# Patient Record
Sex: MALE | Race: WHITE | URBAN - METROPOLITAN AREA
[De-identification: names, ages, dates, MRNs, and addresses within clinical notes are randomized per-mention and may not be internally consistent; named-entity substitution may affect disease eponyms.]

---

## 2018-03-12 ENCOUNTER — OFFICE VISIT (OUTPATIENT)
Dept: INTERNAL MEDICINE CLINIC | Age: 48
End: 2018-03-12

## 2018-03-12 VITALS
RESPIRATION RATE: 15 BRPM | WEIGHT: 168.2 LBS | HEART RATE: 75 BPM | SYSTOLIC BLOOD PRESSURE: 100 MMHG | TEMPERATURE: 97 F | DIASTOLIC BLOOD PRESSURE: 62 MMHG | BODY MASS INDEX: 27.03 KG/M2 | HEIGHT: 66 IN | OXYGEN SATURATION: 96 %

## 2018-03-12 DIAGNOSIS — M79.10 MYALGIA: ICD-10-CM

## 2018-03-12 DIAGNOSIS — M25.50 ARTHRALGIA, UNSPECIFIED JOINT: ICD-10-CM

## 2018-03-12 DIAGNOSIS — J45.20 MILD INTERMITTENT ASTHMA, UNSPECIFIED WHETHER COMPLICATED: Primary | ICD-10-CM

## 2018-03-12 RX ORDER — ALBUTEROL SULFATE 90 UG/1
2 AEROSOL, METERED RESPIRATORY (INHALATION)
Qty: 1 INHALER | Refills: 5 | Status: SHIPPED | OUTPATIENT
Start: 2018-03-12

## 2018-03-12 NOTE — PROGRESS NOTES
Establish Care (New patient, here to establish care.)       HPI:  Severo Silversmith is a 50y.o. year old male who is here to establish care. He  had his medical care:    PCP (across the street)    He reports the following history and medical concerns:      Phantom joint pain that travels. Stays for a day and then goes away. ? Gout. Father has gout. Holistic approach. Right elbow- had an \"egg\" on elbow for 2 months. Didn't it really hurt. And then it went down. internet search \"palindrome rheumatism\"    4-5 years duration. More frequent recently. Sometimes joint swelling but not often. Most common knees and sometimes hands. Can't make a fist sometime. One left knuckle index finger. No pain in wrists. Never at same time. - had knees xrayed a couple of years ago. Saw knee specialist.  Was on a business trip and had to hobble. Talked with PCP about it but didn't do blood tests. Lasts a day to a week. Takes 600 mg ibuprofen and it does help. Last flare up- a couple of weeks ago on elbow when he had swelling. Once every 2 months. Diet is pretty good. No fever, fatigue. Appetite is good. Hx of mild asthma.  (has an inhaler)    Dr. Lillie Hampton    No GI symptoms- a lot of stress for 2 years. Assessment and Plan        1. Mild intermittent asthma, unspecified whether complicated  Rare episodes but have albuterol on hand. - albuterol (PROVENTIL HFA, VENTOLIN HFA, PROAIR HFA) 90 mcg/actuation inhaler; Take 2 Puffs by inhalation every four (4) hours as needed for Wheezing. Indications: BRONCHOSPASM PREVENTION  Dispense: 1 Inhaler; Refill: 5    2. Arthralgia, unspecified joint  Suspect GI etiology triggering inflammation in joints. Mostly knee issues  Turmeric may help    - RAMONA PANEL  - CBC WITH AUTOMATED DIFF  - TSH REFLEX TO T4  - METABOLIC PANEL, COMPREHENSIVE  - VITAMIN D, 25 HYDROXY  - URIC ACID  - C REACTIVE PROTEIN, QT    3.  Myalgia  Suspect food related- food diary.  - VITAMIN D, 25 HYDROXY          Visit Vitals    /62 (BP 1 Location: Left arm, BP Patient Position: Sitting)    Pulse 75    Temp 97 °F (36.1 °C) (Oral)    Resp 15    Ht 5' 6\" (1.676 m)    Wt 168 lb 3.2 oz (76.3 kg)    SpO2 96%    BMI 27.15 kg/m2       Historical Data    Past Medical History:   Diagnosis Date    Mild intermittent asthma 3/12/2018       Past Surgical History:   Procedure Laterality Date    HX VASECTOMY  2005       Outpatient Encounter Prescriptions as of 3/12/2018   Medication Sig Dispense Refill    albuterol (PROVENTIL HFA, VENTOLIN HFA, PROAIR HFA) 90 mcg/actuation inhaler Take 2 Puffs by inhalation every four (4) hours as needed for Wheezing. Indications: BRONCHOSPASM PREVENTION 1 Inhaler 5     No facility-administered encounter medications on file as of 3/12/2018. No Known Allergies     Social History     Social History    Marital status:      Spouse name: N/A    Number of children: N/A    Years of education: N/A     Occupational History    Not on file. Social History Main Topics    Smoking status: Never Smoker    Smokeless tobacco: Never Used    Alcohol use 1.8 oz/week     1 Glasses of wine, 2 Cans of beer per week    Drug use: No    Sexual activity: Yes     Partners: Female     Other Topics Concern    Not on file     Social History Narrative    No narrative on file        family history includes Gout in his father; Stroke in his father. Review of Systems   Constitutional: Negative for weight loss. Eyes: Negative for blurred vision. Respiratory: Negative for shortness of breath. Cardiovascular: Negative for chest pain. Gastrointestinal: Negative for abdominal pain. Genitourinary: Negative for dysuria, flank pain, frequency and hematuria. Musculoskeletal: Positive for joint pain. Negative for myalgias and neck pain. Skin: Negative for rash. Neurological: Negative for dizziness, focal weakness, weakness and headaches. Endo/Heme/Allergies: Negative for environmental allergies. Does not bruise/bleed easily. Physical Exam   Constitutional: He is oriented to person, place, and time. He appears well-nourished. No distress. Neck: Carotid bruit is not present. No thyromegaly present. Cardiovascular: Normal rate, regular rhythm and normal heart sounds. Pulmonary/Chest: Effort normal and breath sounds normal. No respiratory distress. He has no wheezes. Abdominal: Soft. Bowel sounds are normal. He exhibits no mass. There is no tenderness. Musculoskeletal: He exhibits no edema or tenderness. Lymphadenopathy:     He has no cervical adenopathy. Neurological: He is alert and oriented to person, place, and time. Skin: Skin is warm and dry. No rash noted. No erythema. Psychiatric: He has a normal mood and affect. Thought content normal.   Nursing note and vitals reviewed. Ortho Exam       Orders Placed This Encounter    albuterol (PROVENTIL HFA, VENTOLIN HFA, PROAIR HFA) 90 mcg/actuation inhaler     Sig: Take 2 Puffs by inhalation every four (4) hours as needed for Wheezing. Indications: BRONCHOSPASM PREVENTION     Dispense:  1 Inhaler     Refill:  5        I have reviewed the patient's medical history in detail and updated the computerized patient record. We had a prolonged discussion about these complex clinical issues and went over the various important aspects to consider. All questions were answered. Advised him to call back or return to office if symptoms do not improve, change in nature, or persist.    He was given an after visit summary or informed of [facility] which includes patient instructions, diagnoses, current medications, & vitals. He expressed understanding with the diagnosis and plan.

## 2018-03-12 NOTE — PATIENT INSTRUCTIONS
Turmeric (curcumin)  Radha hodge milk              HEALTHY SWEETS  How much: Sparingly  Healthy choices: Unsweetened dried fruit, dark chocolate, fruit sorbet  Why: Dark chocolate provides polyphenols with antioxidant activity. Choose dark chocolate with at least 70 percent pure cocoa and have an ounce a few times a week. Fruit sorbet is a better option than other frozen desserts. RED WINE  How much: Optional, no more than 1-2 glasses per day  Healthy choices: Organic red wine   Why: Red wine has beneficial antioxidant activity. Limit intake to no more than 1-2 servings per day. If you do not drink alcohol, do not start. SUPPLEMENTS  How much: Daily   Healthy choices: High quality multivitamin/multimineral that includes key antioxidants (vitamin C, vitamin E, mixed carotenoids, and selenium); co-enzyme Q10; 2-3 grams of a molecularly distilled fish oil; 2,000 IU of vitamin D3   Why: Supplements help fill any gaps in your diet when you are unable to get your daily requirement of micronutrients. Click here to learn more about supplements and get your free recommendation. TEA  How much: 2-4 cups per day  Healthy choices: White, green, oolong teas  Why: Tea is rich in catechins, antioxidant compounds that reduce inflammation. Purchase high-quality tea and learn how to correctly brew it for maximum taste and health benefits. HEALTHY HERBS & SPICES  How much: Unlimited amounts  Healthy choices: Turmeric, marcano powder (which contains turmeric), benito and garlic (dried and fresh), chili peppers, basil, cinnamon, rosemary, thyme  Why: Use these herbs and spices generously to season foods. Turmeric and benito are powerful, natural anti-inflammatory agents.   OTHER SOURCES OF PROTEIN  How much: 1-2 servings a week (one portion is equal to 1 ounce of cheese, 1 eight-ounce serving of dairy, 1 egg, 3 ounces cooked poultry or skinless meat)  Healthy choices: High quality natural cheese and yogurt, omega-3 enriched eggs, skinless poultry, grass-fed lean meats  Why: In general, try to reduce consumption of animal foods. If you eat chicken, choose organic, cage-free chicken and remove the skin and associated fat. Use organic, reduced-fat dairy products moderately, especially yogurt and natural cheeses such as Emmental (Swiss), [place] the [place] and true ROSS. If you eat eggs, choose omega-3 enriched eggs (made by feeding hens a flax-meal-enriched diet), or organic eggs from free-range chickens. COOKED  MUSHROOMS  How much: Unlimited amounts  Healthy choices: Shiitake, enokidake, maitake, oyster mushrooms (and wild mushrooms if available)   Why: These mushrooms contain compounds that enhance immune function. Never eat mushrooms raw, and minimize consumption of common commercial button mushrooms (including crimini and portobello). WHOLE SOY FOODS  How much: 1-2 servings per day (one serving is equal to ½ cup tofu or tempeh, 1 cup soymilk, ½ cup cooked edamame, 1 ounce of soynuts)  Healthy choices: Tofu, tempeh, edamame, soy nuts, soymilk  Why: Soy foods contain isoflavones that have antioxidant activity and are protective against cancer. Choose whole soy foods over fractionated foods like isolated soy protein powders and imitation meats made with soy isolate. FISH & SEAFOOD  How much:  2-6 servings per week (one serving is equal to 4 ounces of fish or seafood)  Healthy choices: Wild [place] salmon (especially sockeye), herring, sardines, and black cod (sablefish)  Why: These fish are rich in omega-3 fats, which are strongly anti-inflammatory. If you choose not to eat fish, take a molecularly distilled fish oil supplement that provides both EPA and DHA in a dose of 2-3 grams per day. HEALTHY FATS  How much:  5-7 servings per day (one serving is equal to 1 teaspoon of oil, 2 walnuts, 1 tablespoon of flaxseed, 1 ounce of avocado)   Healthy choices: For cooking, use extra virgin olive oil and expeller-pressed organic canola oil. Other sources of healthy fats include nuts (especially walnuts), avocados, and seeds - including hemp seeds and freshly ground flaxseed. Omega-3 fats are also found in cold water fish, omega-3 enriched eggs, and whole soy foods. Organic, expeller pressed, high-oleic sunflower or safflower oils may also be used, as well as walnut and hazelnut oils in salads and dark roasted sesame oil as a flavoring for soups and stir-fries  Why: Healthy fats are those rich in either monounsaturated or omega-3 fats. Extra-virgin olive oil is rich in polyphenols with antioxidant activity and canola oil contains a small fraction of omega-3 fatty acids. WHOLE & CRACKED GRAINS  How much:  3-5 servings a day (one serving is equal to about ½ cup cooked grains)  Healthy choices: [place], basmati rice, wild rice, buckwheat, groats, barley, quinoa, steel-cut oats   Why: Whole grains digest slowly, reducing frequency of spikes in blood sugar that promote inflammation. \"Whole grains\" means grains that are intact or in a few large pieces, not whole wheat bread or other products made from flour. PASTA (al dente)  How much: 2-3 servings per week (one serving is equal to about ½ cup cooked pasta)  Healthy choices: Organic pasta, rice noodles, bean thread noodles, and part whole wheat and buckwheat noodles like Japanese udon and soba  Why: Pasta cooked al dente (when it has \"tooth\" to it) has a lower glycemic index than fully-cooked pasta. Low-glycemic-load carbohydrates should be the bulk of your carbohydrate intake to help minimize spikes in blood glucose levels. BEANS & LEGUMES  How much: 1-2 servings per day (one serving is equal to ½ cup cooked beans or legumes)  Healthy choices: Beans like [place], adzuki and black, as well as chickpeas, black-eyed peas and lentils  Why: Beans are rich in folic acid, magnesium, potassium and soluble fiber. They are a low-glycemic-load food.   Eat them well-cooked either whole or pureed into spreads like hummus. VEGETABLES  How much: 4-5 servings per day minimum (one serving is equal to 2 cups salad greens, ½ cup vegetables cooked, raw or juiced)  Healthy Choices: Lightly cooked dark leafy greens (spinach, sepideh greens, kale, Swiss chard), cruciferous vegetables (broccoli, cabbage, [place] sprouts, kale, bok jarad and cauliflower), carrots, beets, onions, peas, squashes, sea vegetables and washed raw salad greens  Why: Vegetables are rich in flavonoids and carotenoids with both antioxidant and anti-inflammatory activity. Go for a wide range of colors, eat them both raw and cooked, and choose organic when possible. FRUITS  How much:  3-4 servings per day (one serving is equal to 1 medium size piece of fruit, ½ cup chopped fruit, ¼ cup of dried fruit)  Healthy choices: Raspberries, blueberries, strawberries, peaches, nectarines, oranges, pink grapefruit, red grapes, plums, pomegranates, blackberries, cherries, apples, and pears - all lower in glycemic load than most tropical fruits  Why: Fruits are rich in flavonoids and carotenoids with both antioxidant and anti-inflammatory activity. Go for a wide range of colors, choose fruit that is fresh in season or frozen, and buy organic when possible. Additional Item:  WATER  How much: Throughout the day  Healthy choices: Drink pure water, or drinks that are mostly water (tea, very diluted fruit juice, sparkling water with lemon) throughout the day. Why: Water is vital for overall functioning of the body. Exercise 1:  The 4-7-8 (or Relaxing Breath) Exercise  This exercise is utterly simple, takes almost no time, requires no equipment and can be done anywhere. Although you can do the exercise in any position, sit with your back straight while learning the exercise. Place the tip of your tongue against the ridge of tissue just behind your upper front teeth, and keep it there through the entire exercise.  You will be exhaling through your mouth around your tongue; try pursing your lips slightly if this seems awkward.  Exhale completely through your mouth, making a whoosh sound.  Close your mouth and inhale quietly through your nose to a mental count of four.  Hold your breath for a count of seven.  Exhale completely through your mouth, making a whoosh sound to a count of eight.  This is one breath. Now inhale again and repeat the cycle three more times for a total of four breaths. Note that you always inhale quietly through your nose and exhale audibly through your mouth. The tip of your tongue stays in position the whole time. Exhalation takes twice as long as inhalation. The absolute time you spend on each phase is not important; the ratio of 4:7:8 is important. If you have trouble holding your breath, speed the exercise up but keep to the ratio of 4:7:8 for the three phases. With practice you can slow it all down and get used to inhaling and exhaling more and more deeply. This exercise is a natural tranquilizer for the nervous system. Unlike tranquilizing drugs, which are often effective when you first take them but then lose their power over time, this exercise is subtle when you first try it but gains in power with repetition and practice. Do it at least twice a day. You cannot do it too frequently. Do not do more than four breaths at one time for the first month of practice. Later, if you wish, you can extend it to eight breaths. If you feel a little lightheaded when you first breathe this way, do not be concerned; it will pass. Once you develop this technique by practicing it every day, it will be a very useful tool that you will always have with you. Use it whenever anything upsetting happens - before you react. Use it whenever you are aware of internal tension. Use it to help you fall asleep. This exercise cannot be recommended too highly. Everyone can benefit from it.         Taken from Rhys Thomas MD [facility]  Search [place] breathing exercises on google and you will see a video       Exercise 1:  The 4-7-8 (or Relaxing Breath) Exercise  This exercise is utterly simple, takes almost no time, requires no equipment and can be done anywhere. Although you can do the exercise in any position, sit with your back straight while learning the exercise. Place the tip of your tongue against the ridge of tissue just behind your upper front teeth, and keep it there through the entire exercise. You will be exhaling through your mouth around your tongue; try pursing your lips slightly if this seems awkward.  Exhale completely through your mouth, making a whoosh sound.  Close your mouth and inhale quietly through your nose to a mental count of four.  Hold your breath for a count of seven.  Exhale completely through your mouth, making a whoosh sound to a count of eight.  This is one breath. Now inhale again and repeat the cycle three more times for a total of four breaths. Note that you always inhale quietly through your nose and exhale audibly through your mouth. The tip of your tongue stays in position the whole time. Exhalation takes twice as long as inhalation. The absolute time you spend on each phase is not important; the ratio of 4:7:8 is important. If you have trouble holding your breath, speed the exercise up but keep to the ratio of 4:7:8 for the three phases. With practice you can slow it all down and get used to inhaling and exhaling more and more deeply. This exercise is a natural tranquilizer for the nervous system. Unlike tranquilizing drugs, which are often effective when you first take them but then lose their power over time, this exercise is subtle when you first try it but gains in power with repetition and practice. Do it at least twice a day. You cannot do it too frequently. Do not do more than four breaths at one time for the first month of practice.  Later, if you wish, you can extend it to eight breaths. If you feel a little lightheaded when you first breathe this way, do not be concerned; it will pass. Once you develop this technique by practicing it every day, it will be a very useful tool that you will always have with you. Use it whenever anything upsetting happens - before you react. Use it whenever you are aware of internal tension. Use it to help you fall asleep. This exercise cannot be recommended too highly. Everyone can benefit from it. Exercise 2:  Breath Counting  If you want to get a feel for this challenging work, try your hand at breath counting, a deceptively simple technique much used in [address] practice. Sit in a comfortable position with the spine straight and head inclined slightly forward. Gently close your eyes and take a few deep breaths. Then let the breath come naturally without trying to influence it. Ideally it will be quiet and slow, but depth and rhythm may vary.  To begin the exercise, count \"one\" to yourself as you exhale.  The next time you exhale, count \"two,\" and so on up to [place]. \"   Then begin a new cycle, counting \"one\" on the next exhalation. Never count higher than \"five,\" and count only when you exhale. You will know your attention has wandered when you find yourself up to \"eight,\" \"12,\" even \"19. \"  Try to do 10 minutes of this form of meditation.      Taken from Gi Becerra MD [facility] [address] [place]" breathing exercises on [place] and you will see a video

## 2018-03-12 NOTE — MR AVS SNAPSHOT
[address] [facility], [address] [place] [address] 
[phone] Patient: Faizan Correa 
MRN: [id number] :1970 Visit Information Date & Time Provider Department Dept. Phone Encounter #  
 3/12/2018 10:45 AM Zoran Koo MD [facility] Internists [phone] Upcoming Health Maintenance Date Due DTaP/Tdap/Td series (1 - Tdap) 1989 Influenza Age 5 to Adult 2017 Allergies as of 3/12/2018  Review Complete On: 3/12/2018 By: Zoran Koo MD  
 No Known Allergies Current Immunizations  Never Reviewed No immunizations on file. Not reviewed this visit You Were Diagnosed With   
  
 Codes Comments Mild intermittent asthma, unspecified whether complicated    -  Primary ICD-10-CM: J45.20 ICD-9-CM: 493.90 Arthralgia, unspecified joint     ICD-10-CM: M25.50 ICD-9-CM: 719.40 Myalgia     ICD-10-CM: M79.1 ICD-9-CM: 729.1 Vitals BP Pulse Temp Resp Height(growth percentile) Weight(growth percentile) 100/62 (BP 1 Location: Left arm, BP Patient Position: Sitting) 75 97 °F (36.1 °C) (Oral) 15 5' 6\" (1.676 m) 168 lb 3.2 oz (76.3 kg) SpO2 BMI Smoking Status 96% 27.15 kg/m2 Never Smoker Vitals History BMI and BSA Data Body Mass Index Body Surface Area  
 27.15 kg/m 2 1.88 m 2 Your Updated Medication List  
  
   
This list is accurate as of 3/12/18 11:58 AM.  Always use your most recent med list.  
  
  
  
  
 albuterol 90 mcg/actuation inhaler Commonly known as:  PROVENTIL HFA, VENTOLIN HFA, PROAIR HFA Take 2 Puffs by inhalation every four (4) hours as needed for Wheezing. Indications: BRONCHOSPASM PREVENTION Prescriptions Printed Refills  
 albuterol (PROVENTIL HFA, VENTOLIN HFA, PROAIR HFA) 90 mcg/actuation inhaler 5  Sig: Take 2 Puffs by inhalation every four (4) hours as needed for Wheezing. Indications: BRONCHOSPASM PREVENTION Class: Print Route: Inhalation We Performed the Following RAMONA PANEL [id number] CPT(R)] C REACTIVE PROTEIN, QT [90825 CPT(R)] CBC WITH AUTOMATED DIFF [33109 CPT(R)] METABOLIC PANEL, COMPREHENSIVE [35479 CPT(R)] TSH REFLEX TO T4 [20014 CPT(R)] URIC ACID [id number] CPT(R)] VITAMIN D, 25 HYDROXY [id number] CPT(R)] Patient Instructions Turmeric (curcumin) Radha hodge milk HEALTHY SWEETS How much: Sparingly Healthy choices: Unsweetened dried fruit, dark chocolate, fruit sorbet Why: Dark chocolate provides polyphenols with antioxidant activity. Choose dark chocolate with at least 70 percent pure cocoa and have an ounce a few times a week. Fruit sorbet is a better option than other frozen desserts. RED WINE How much: Optional, no more than 1-2 glasses per day Healthy choices: Organic red wine Why: Red wine has beneficial antioxidant activity. Limit intake to no more than 1-2 servings per day. If you do not drink alcohol, do not start. SUPPLEMENTS How much: Daily Healthy choices: High quality multivitamin/multimineral that includes key antioxidants (vitamin C, vitamin E, mixed carotenoids, and selenium); co-enzyme Q10; 2-3 grams of a molecularly distilled fish oil; 2,000 IU of vitamin D3 Why: Supplements help fill any gaps in your diet when you are unable to get your daily requirement of micronutrients. Click here to learn more about supplements and get your free recommendation. TEA How much: 2-4 cups per day Healthy choices: White, green, oolong teas Why: Tea is rich in catechins, antioxidant compounds that reduce inflammation. Purchase high-quality tea and learn how to correctly brew it for maximum taste and health benefits. [facility] How much: Unlimited amounts Healthy choices: Turmeric, marcano powder (which contains turmeric), benito and garlic (dried and fresh), chili peppers, basil, cinnamon, rosemary, thyme Why: Use these herbs and spices generously to season foods. Turmeric and benito are powerful, natural anti-inflammatory agents. OTHER SOURCES OF PROTEIN How much: 1-2 servings a week (one portion is equal to 1 ounce of cheese, 1 eight-ounce serving of dairy, 1 egg, 3 ounces cooked poultry or skinless meat) Healthy choices: High quality natural cheese and yogurt, omega-3 enriched eggs, skinless poultry, grass-fed lean meats Why: In general, try to reduce consumption of animal foods. If you eat chicken, choose organic, cage-free chicken and remove the skin and associated fat. Use organic, reduced-fat dairy products moderately, especially yogurt and natural cheeses such as Emmental (Swiss), [place] and true ROSS. If you eat eggs, choose omega-3 enriched eggs (made by feeding hens a flax-meal-enriched diet), or organic eggs from free-range chickens. [facility] How much: Unlimited amounts Healthy choices: Shiitake, enokidake, maitake, oyster mushrooms (and wild mushrooms if available) Why: These mushrooms contain compounds that enhance immune function. Never eat mushrooms raw, and minimize consumption of common commercial button mushrooms (including crimini and portobello). WHOLE SOY FOODS How much: 1-2 servings per day (one serving is equal to ½ cup tofu or tempeh, 1 cup soymilk, ½ cup cooked edamame, 1 ounce of soynuts) Healthy choices: Tofu, tempeh, edamame, soy nuts, soymilk Why: Soy foods contain isoflavones that have antioxidant activity and are protective against cancer. Choose whole soy foods over fractionated foods like isolated soy protein powders and imitation meats made with soy isolate. FISH & SEAFOOD How much:  2-6 servings per week (one serving is equal to 4 ounces of fish or seafood) Healthy choices: Wild [place] salmon (especially sockeye), herring, sardines, and black cod (sablefish) Why: These fish are rich in omega-3 fats, which are strongly anti-inflammatory. If you choose not to eat fish, take a molecularly distilled fish oil supplement that provides both EPA and DHA in a dose of 2-3 grams per day. HEALTHY FATS How much:  5-7 servings per day (one serving is equal to 1 teaspoon of oil, 2 walnuts, 1 tablespoon of flaxseed, 1 ounce of avocado) Healthy choices: For cooking, use extra virgin olive oil and expeller-pressed organic canola oil. Other sources of healthy fats include nuts (especially walnuts), avocados, and seeds - including hemp seeds and freshly ground flaxseed. Omega-3 fats are also found in cold water fish, omega-3 enriched eggs, and whole soy foods. Organic, expeller pressed, high-oleic sunflower or safflower oils may also be used, as well as walnut and hazelnut oils in salads and dark roasted sesame oil as a flavoring for soups and stir-fries Why: Healthy fats are those rich in either monounsaturated or omega-3 fats. Extra-virgin olive oil is rich in polyphenols with antioxidant activity and canola oil contains a small fraction of omega-3 fatty acids. WHOLE & CRACKED GRAINS How much:  3-5 servings a day (one serving is equal to about ½ cup cooked grains) Healthy choices: Brown rice, basmati rice, wild rice, buckwheat, groats, barley, quinoa, steel-cut oats Why: Whole grains digest slowly, reducing frequency of spikes in blood sugar that promote inflammation. \"Whole grains\" means grains that are intact or in a few large pieces, not whole wheat bread or other products made from flour. PASTA (al dente) How much: 2-3 servings per week (one serving is equal to about ½ cup cooked pasta) Healthy choices: Organic pasta, rice noodles, bean thread noodles, and part whole wheat and buckwheat noodles like [place] udon and soba Why: Pasta cooked al dente (when it has \"tooth\" to it) has a lower glycemic index than fully-cooked pasta.  Low-glycemic-load carbohydrates should be the bulk of your carbohydrate intake to help minimize spikes in blood glucose levels. BEANS & LEGUMES How much: 1-2 servings per day (one serving is equal to ½ cup cooked beans or legumes) Healthy choices: Beans like [place], adzuki and black, as well as chickpeas, black-eyed peas and lentils Why: Beans are rich in folic acid, magnesium, potassium and soluble fiber. They are a low-glycemic-load food. Eat them well-cooked either whole or pureed into spreads like hummus. VEGETABLES How much: 4-5 servings per day minimum (one serving is equal to 2 cups salad greens, ½ cup vegetables cooked, raw or juiced) Healthy Choices: Lightly cooked dark leafy greens (spinach, sepideh greens, kale, Swiss chard), cruciferous vegetables (broccoli, cabbage, [place] sprouts, kale, bok jarad and cauliflower), carrots, beets, onions, peas, squashes, sea vegetables and washed raw salad greens Why: Vegetables are rich in flavonoids and carotenoids with both antioxidant and anti-inflammatory activity. Go for a wide range of colors, eat them both raw and cooked, and choose organic when possible. FRUITS How much:  3-4 servings per day (one serving is equal to 1 medium size piece of fruit, ½ cup chopped fruit, ¼ cup of dried fruit) Healthy choices: Raspberries, blueberries, strawberries, peaches, nectarines, oranges, pink grapefruit, red grapes, plums, pomegranates, blackberries, cherries, apples, and pears - all lower in glycemic load than most tropical fruits Why: Fruits are rich in flavonoids and carotenoids with both antioxidant and anti-inflammatory activity. Go for a wide range of colors, choose fruit that is fresh in season or frozen, and buy organic when possible. Additional Item: 
WATER How much: Throughout the day Healthy choices: Drink pure water, or drinks that are mostly water (tea, very diluted fruit juice, sparkling water with lemon) throughout the day. Why: Water is vital for overall functioning of the body. Exercise 1: 
The 4-7-8 (or Relaxing Breath) Exercise This exercise is utterly simple, takes almost no time, requires no equipment and can be done anywhere. Although you can do the exercise in any position, sit with your back straight while learning the exercise. Place the tip of your tongue against the ridge of tissue just behind your upper front teeth, and keep it there through the entire exercise. You will be exhaling through your mouth around your tongue; try pursing your lips slightly if this seems awkward. ? Exhale completely through your mouth, making a whoosh sound. ? Close your mouth and inhale quietly through your nose to a mental count of four. ? Hold your breath for a count of seven. ? Exhale completely through your mouth, making a whoosh sound to a count of eight. ? This is one breath. Now inhale again and repeat the cycle three more times for a total of four breaths. Note that you always inhale quietly through your nose and exhale audibly through your mouth. The tip of your tongue stays in position the whole time. Exhalation takes twice as long as inhalation. The absolute time you spend on each phase is not important; the ratio of 4:7:8 is important. If you have trouble holding your breath, speed the exercise up but keep to the ratio of 4:7:8 for the three phases. With practice you can slow it all down and get used to inhaling and exhaling more and more deeply. This exercise is a natural tranquilizer for the nervous system. Unlike tranquilizing drugs, which are often effective when you first take them but then lose their power over time, this exercise is subtle when you first try it but gains in power with repetition and practice. Do it at least twice a day. You cannot do it too frequently. Do not do more than four breaths at one time for the first month of practice.  Later, if you wish, you can extend it to eight breaths. If you feel a little lightheaded when you first breathe this way, do not be concerned; it will pass. Once you develop this technique by practicing it every day, it will be a very useful tool that you will always have with you. Use it whenever anything upsetting happens - before you react. Use it whenever you are aware of internal tension. Use it to help you fall asleep. This exercise cannot be recommended too highly. Everyone can benefit from it. Taken from Chelsie Carmona MD [facility] of [address] Search Vimmax Carmona breathing exercises on google and you will see a video Exercise 1: 
The 4-7-8 (or Relaxing Breath) Exercise This exercise is utterly simple, takes almost no time, requires no equipment and can be done anywhere. Although you can do the exercise in any position, sit with your back straight while learning the exercise. Place the tip of your tongue against the ridge of tissue just behind your upper front teeth, and keep it there through the entire exercise. You will be exhaling through your mouth around your tongue; try pursing your lips slightly if this seems awkward. ? Exhale completely through your mouth, making a whoosh sound. ? Close your mouth and inhale quietly through your nose to a mental count of four. ? Hold your breath for a count of seven. ? Exhale completely through your mouth, making a whoosh sound to a count of eight. ? This is one breath. Now inhale again and repeat the cycle three more times for a total of four breaths. Note that you always inhale quietly through your nose and exhale audibly through your mouth. The tip of your tongue stays in position the whole time. Exhalation takes twice as long as inhalation. The absolute time you spend on each phase is not important; the ratio of 4:7:8 is important.  If you have trouble holding your breath, speed the exercise up but keep to the ratio of 4:7:8 for the three phases. With practice you can slow it all down and get used to inhaling and exhaling more and more deeply. This exercise is a natural tranquilizer for the nervous system. Unlike tranquilizing drugs, which are often effective when you first take them but then lose their power over time, this exercise is subtle when you first try it but gains in power with repetition and practice. Do it at least twice a day. You cannot do it too frequently. Do not do more than four breaths at one time for the first month of practice. Later, if you wish, you can extend it to eight breaths. If you feel a little lightheaded when you first breathe this way, do not be concerned; it will pass. Once you develop this technique by practicing it every day, it will be a very useful tool that you will always have with you. Use it whenever anything upsetting happens - before you react. Use it whenever you are aware of internal tension. Use it to help you fall asleep. This exercise cannot be recommended too highly. Everyone can benefit from it. Exercise 2: 
Breath Counting If you want to get a feel for this challenging work, try your hand at breath counting, a deceptively simple technique much used in [address] practice. Sit in a comfortable position with the spine straight and head inclined slightly forward. Gently close your eyes and take a few deep breaths. Then let the breath come naturally without trying to influence it. Ideally it will be quiet and slow, but depth and rhythm may vary. ? To begin the exercise, count \"one\" to yourself as you exhale. ? The next time you exhale, count \"two,\" and so on up to [place]. \" 
? Then begin a new cycle, counting \"one\" on the next exhalation. Never count higher than \"five,\" and count only when you exhale. You will know your attention has wandered when you find yourself up to \"eight,\" \"12,\" even \"19. \" Try to do 10 minutes of this form of meditation. Taken from Gi Becerra MD [facility] of [address] Search Greg Becerra breathing exercises on google and you will see a video Introducing [address]! [facility] introduces [employer] patient portal. Now you can access parts of your medical record, email your doctor's office, and request medication refills online. 1. In your internet browser, go to [email or web address] 2. Click on the First Time User? Click Here link in the Sign In box. You will see the New Member Sign Up page. 3. Enter your [employer] Access Code exactly as it appears below. You will not need to use this code after youve completed the sign-up process. If you do not sign up before the expiration date, you must request a new code. · [employer] Access Code: [email or web address] Expires: 6/10/2018 11:13 AM 
 
4. Enter the last four digits of your Social Security Number (xxxx) and Date of Birth (mm/dd/yyyy) as indicated and click Submit. You will be taken to the next sign-up page. 5. Create a [employer] ID. This will be your [employer] login ID and cannot be changed, so think of one that is secure and easy to remember. 6. Create a [employer] password. You can change your password at any time. 7. Enter your Password Reset Question and Answer. This can be used at a later time if you forget your password. 8. Enter your e-mail address. You will receive e-mail notification when new information is available in [address] 9. Click Sign Up. You can now view and download portions of your medical record. 10. Click the Download Summary menu link to download a portable copy of your medical information. If you have questions, please visit the Frequently Asked Questions section of the [employer] website. Remember, [employer] is NOT to be used for urgent needs. For medical emergencies, dial 911. Now available from your iPhone and Android! Please provide this summary of care documentation to your next provider. Your primary care clinician is listed as Saul Shi. If you have any questions after today's visit, please call [phone].

## 2018-03-13 LAB
25(OH)D3+25(OH)D2 SERPL-MCNC: 15.9 NG/ML (ref 30–100)
ALBUMIN SERPL-MCNC: 4.6 G/DL (ref 3.5–5.5)
ALBUMIN/GLOB SERPL: 1.6 {RATIO} (ref 1.2–2.2)
ALP SERPL-CCNC: 59 IU/L (ref 39–117)
ALT SERPL-CCNC: 29 IU/L (ref 0–44)
ANA TITR SER IF: NEGATIVE {TITER}
AST SERPL-CCNC: 22 IU/L (ref 0–40)
BASOPHILS # BLD AUTO: 0 X10E3/UL (ref 0–0.2)
BASOPHILS NFR BLD AUTO: 1 %
BILIRUB SERPL-MCNC: 0.6 MG/DL (ref 0–1.2)
BUN SERPL-MCNC: 17 MG/DL (ref 6–24)
BUN/CREAT SERPL: 16 (ref 9–20)
CALCIUM SERPL-MCNC: 9.4 MG/DL (ref 8.7–10.2)
CHLORIDE SERPL-SCNC: 101 MMOL/L (ref 96–106)
CO2 SERPL-SCNC: 27 MMOL/L (ref 18–29)
CREAT SERPL-MCNC: 1.05 MG/DL (ref 0.76–1.27)
CRP SERPL-MCNC: 1.3 MG/L (ref 0–4.9)
EOSINOPHIL # BLD AUTO: 0.1 X10E3/UL (ref 0–0.4)
EOSINOPHIL NFR BLD AUTO: 2 %
ERYTHROCYTE [DISTWIDTH] IN BLOOD BY AUTOMATED COUNT: 13 % (ref 12.3–15.4)
GFR SERPLBLD CREATININE-BSD FMLA CKD-EPI: 83 ML/MIN/1.73
GFR SERPLBLD CREATININE-BSD FMLA CKD-EPI: 96 ML/MIN/1.73
GLOBULIN SER CALC-MCNC: 2.9 G/DL (ref 1.5–4.5)
GLUCOSE SERPL-MCNC: 91 MG/DL (ref 65–99)
HCT VFR BLD AUTO: 44 % (ref 37.5–51)
HGB BLD-MCNC: 14.6 G/DL (ref 13–17.7)
IMM GRANULOCYTES # BLD: 0 X10E3/UL (ref 0–0.1)
IMM GRANULOCYTES NFR BLD: 0 %
LYMPHOCYTES # BLD AUTO: 1.4 X10E3/UL (ref 0.7–3.1)
LYMPHOCYTES NFR BLD AUTO: 28 %
MCH RBC QN AUTO: 32.2 PG (ref 26.6–33)
MCHC RBC AUTO-ENTMCNC: 33.2 G/DL (ref 31.5–35.7)
MCV RBC AUTO: 97 FL (ref 79–97)
MONOCYTES # BLD AUTO: 0.4 X10E3/UL (ref 0.1–0.9)
MONOCYTES NFR BLD AUTO: 9 %
NEUTROPHILS # BLD AUTO: 3 X10E3/UL (ref 1.4–7)
NEUTROPHILS NFR BLD AUTO: 60 %
PLATELET # BLD AUTO: 240 X10E3/UL (ref 150–379)
PLEASE NOTE, 734348: NORMAL
POTASSIUM SERPL-SCNC: 4.7 MMOL/L (ref 3.5–5.2)
PROT SERPL-MCNC: 7.5 G/DL (ref 6–8.5)
RBC # BLD AUTO: 4.53 X10E6/UL (ref 4.14–5.8)
SODIUM SERPL-SCNC: 142 MMOL/L (ref 134–144)
TSH SERPL DL<=0.005 MIU/L-ACNC: 3.31 UIU/ML (ref 0.45–4.5)
URATE SERPL-MCNC: 5.9 MG/DL (ref 3.7–8.6)
WBC # BLD AUTO: 4.9 X10E3/UL (ref 3.4–10.8)

## 2018-03-14 NOTE — PROGRESS NOTES
Letter sent to patient with notes.     Signed electronically by: Estefania De Leon MD at 8:03 AM on March 14, 2018

## 2018-04-05 ENCOUNTER — PATIENT MESSAGE (OUTPATIENT)
Dept: INTERNAL MEDICINE CLINIC | Age: 48
End: 2018-04-05